# Patient Record
Sex: FEMALE | Race: WHITE
[De-identification: names, ages, dates, MRNs, and addresses within clinical notes are randomized per-mention and may not be internally consistent; named-entity substitution may affect disease eponyms.]

---

## 2018-03-20 ENCOUNTER — HOSPITAL ENCOUNTER (EMERGENCY)
Dept: HOSPITAL 91 - E/R | Age: 43
Discharge: HOME | End: 2018-03-20
Payer: MEDICAID

## 2018-03-20 ENCOUNTER — HOSPITAL ENCOUNTER (EMERGENCY)
Age: 43
Discharge: HOME | End: 2018-03-20

## 2018-03-20 DIAGNOSIS — J03.90: Primary | ICD-10-CM

## 2018-03-20 DIAGNOSIS — J01.00: ICD-10-CM

## 2018-03-20 PROCEDURE — 99283 EMERGENCY DEPT VISIT LOW MDM: CPT

## 2018-09-25 ENCOUNTER — HOSPITAL ENCOUNTER (EMERGENCY)
Age: 43
Discharge: HOME | End: 2018-09-25

## 2018-09-25 ENCOUNTER — HOSPITAL ENCOUNTER (EMERGENCY)
Dept: HOSPITAL 91 - FTE | Age: 43
Discharge: HOME | End: 2018-09-25
Payer: MEDICAID

## 2018-09-25 DIAGNOSIS — M54.2: Primary | ICD-10-CM

## 2018-09-25 PROCEDURE — 99282 EMERGENCY DEPT VISIT SF MDM: CPT

## 2018-09-25 RX ADMIN — IBUPROFEN 1 MG: 800 TABLET, FILM COATED ORAL at 16:34

## 2019-03-08 ENCOUNTER — HOSPITAL ENCOUNTER (EMERGENCY)
Dept: HOSPITAL 10 - FTE | Age: 44
Discharge: HOME | End: 2019-03-08
Payer: MEDICAID

## 2019-03-08 ENCOUNTER — HOSPITAL ENCOUNTER (EMERGENCY)
Dept: HOSPITAL 91 - FTE | Age: 44
Discharge: HOME | End: 2019-03-08
Payer: MEDICAID

## 2019-03-08 VITALS — WEIGHT: 187.39 LBS | HEIGHT: 55 IN | BODY MASS INDEX: 43.37 KG/M2

## 2019-03-08 VITALS — RESPIRATION RATE: 18 BRPM | SYSTOLIC BLOOD PRESSURE: 131 MMHG | DIASTOLIC BLOOD PRESSURE: 82 MMHG | HEART RATE: 71 BPM

## 2019-03-08 DIAGNOSIS — M54.41: Primary | ICD-10-CM

## 2019-03-08 LAB
URINE KETONES (DIP) POC: (no result)
URINE PH (DIP) POC: 6.5 (ref 5–8.5)

## 2019-03-08 PROCEDURE — 81025 URINE PREGNANCY TEST: CPT

## 2019-03-08 PROCEDURE — 96372 THER/PROPH/DIAG INJ SC/IM: CPT

## 2019-03-08 PROCEDURE — 81003 URINALYSIS AUTO W/O SCOPE: CPT

## 2019-03-08 PROCEDURE — 99284 EMERGENCY DEPT VISIT MOD MDM: CPT

## 2019-03-08 RX ADMIN — KETOROLAC TROMETHAMINE 1 MG: 30 INJECTION, SOLUTION INTRAMUSCULAR at 11:25

## 2019-03-08 NOTE — ERD
ER Documentation


Chief Complaint


Chief Complaint





DYSURIA SINCE LAST WEEK





HPI


43-year old female presents with low back pain for the last week.  She does a 


lot of lifting at work but denies any history of acute trauma.  She also feels 


that she is urinating frequently.  She is concerned she has a kidney infection 


as she was told she had a kidney for infection in the past.  She points to her 


right L4-L5 and gluteal area or iliac areas as area of pain.  Pain radiates to 


her right buttock and leg.  She describes it in triage as 0 out of 10.





ROS


All systems reviewed and are negative except as per history of present illness.





Medications


Home Meds


Active Scripts


Cyclobenzaprine Hcl* (Cyclobenzaprine Hcl*) 10 Mg Tablet, 10 MG PO TID, #20 TAB


   Prov:MAGDALENE LOPES MD         3/8/19


Ibuprofen* (Motrin*) 600 Mg Tab, 600 MG PO Q6, #20 TAB


   Prov:MAGDALENE LOPES MD         3/8/19


Naproxen* (Naprosyn*) 500 Mg Tablet, 500 MG PO BID PRN for PAIN AND/OR 


INFLAMMATION, #30 TAB


   Prov:PAPI HOOD PA-C         9/25/18


Fluticasone Propionate (Flonase Allergy Relief) 9.9 Ml [place].susp, 1 SPRAY NASAL


BID, #1 BOTTLE


   TO EACH NOSTRIL


   Prov:SHANTELL,ELISEO         3/20/18


Phenol/Glycerin (Chloraseptic Max Spray) 30 Ml Spray, 5 SPRAY MM Q2H PRN for 


SORE THROAT, #1 BOTTLE


   Prov:SHANTELL,ELISEO         3/20/18


Azithromycin* (Zithromax*) 250 Mg Tablet, 250 MG PO .ZPACK AS DIRECTED, #6 TAB


   TAKE 500 MG (2 TABS) THE FIRST DAY THEN 250 MG (1 TAB) DAYS 2-5


   Prov:SHANTELL,ELISEO         3/20/18





Allergies


Allergies:  


Coded Allergies:  


     No Known Allergy (Verified , 9/25/18)





PMhx/Soc


Medical and Surgical Hx:  pt denies Medical Hx, pt denies Surgical Hx


Hx Alcohol Use:  No


Hx Substance Use:  No


Hx Tobacco Use:  No


Smoking Status:  Never smoker





FmHx


Family History:  No diabetes, No coronary disease, No other





Physical Exam


Vitals





Vital Signs


  Date      Temp  Pulse  Resp  B/P (MAP)   Pulse Ox  O2          O2 Flow    FiO2


Time                                                 Delivery    Rate


    3/8/19  98.9     71    18      131/82        99  Room Air


     11:55                           (98)


    3/8/19  98.9     79    18      130/71        99


     09:42                           (90)





Physical Exam


Const:   No acute distress


Head:   Atraumatic 


Eyes:    Normal Conjunctiva


ENT:    Normal External Ears, Nose and Mouth.


Neck:               Full range of motion. No meningismus.


Resp:   Clear to auscultation bilaterally


Cardio:   Regular rate and rhythm, no murmurs


Abd:    Soft, non tender, non distended. Normal bowel sounds


Skin:   No petechiae or rashes


Back:   No midline or flank tenderness.  Tenderness right L4 L5-S1 area lumbar 


paraspinous muscles.  Positive straight leg raise on the right.


Ext:    No cyanosis, or edema


Neur:   Awake and alert.  Normal gait.  No appreciable focal neurologic 


deficits.


Psych:    Normal Mood and Affect


Results 24 hrs





Laboratory Tests


        Test
                                3/8/19
11:21  3/8/19
11:38


        POC Beta HCG, Qualitative            NEGATIVE


        Bedside Urine pH (LAB)                                     6.5


        Bedside Urine Protein (LAB)                        Negative


        Bedside Urine Glucose (UA)                         Negative


        Bedside Urine Ketones (LAB)                                 1+


        Bedside Urine Blood                                Negative


        Bedside Urine Nitrite (LAB)                        Negative


        Bedside Urine Leukocyte
Esterase (L  
             Negative 






Current Medications


 Medications
   Dose
          Sig/Miguel
       Start Time
   Status  Last


 (Trade)       Ordered        Route
 PRN     Stop Time              Admin
Dose


                              Reason                                Admin


 Ketorolac
     30 mg          ONCE  STAT
    3/8/19        DC            3/8/19


Tromethamine
                 IM
            11:11
 3/8/19                11:25



 (Toradol)                                   11:13








Procedures/MDM


Urine shows no leukocytes, nitrites, glucose, hemoglobin.  HCG negative.  


Patient was given Toradol 30 mg IM.  Patient presents with right-sided low back 


pain.  She is concerned about a kidney infection but her urine is normal.  Pain 


is not in the area of kidneys.  Pain is likely musculoskeletal, lumbar strain or


sciatica.  No signs of cauda equina syndrome, epidural abscess, deficits, 


ischemia, additional complications.  Will treat with ibuprofen, Flexeril, 


instructions for back exercises, primary care follow-up and return precautions. 


The patient was stable with no new complaints during the ER course. Clinically, 


there is no current evidence to suggest meningitis, sepsis, acute abdomen, 


pneumonia, stroke,  acute coronary syndrome, pulmonary embolism, aortic 


dissection or any other emergent condition appearing to require further 


evaluation or hospitalization.  Patient counseled regarding my diagnostic imp


ression and care plan. Prior to discharge all questions answered. Pt agrees with


treatment plan and understands strict return precautions. Pt is instructed to 


follow up with primary care provider within 24-48 hours. Precautionary 


instructions provided including instructions to return to the ER if not 


improving or for any worsening or changing symptoms or concerns.





Departure


Diagnosis:  


   Primary Impression:  


   Back pain


   Back pain location:  low back pain  Chronicity:  acute  Back pain laterality:


    right  Sciatica presence:  with sciatica  Sciatica laterality:  sciatica of 


   right side  Qualified Codes:  M54.41 - Lumbago with sciatica, right side


Condition:  Stable


Patient Instructions:  Back Exercises, Lumbar, Back Pain W/ Sciatica


Referrals:  


[facility]  ([facility])


Usted se ha hecho un examen mdico de control que le indica que no est en ludy 


condicin que requiera tratamiento urgente en el Departamento de Emergencia. Un 


estudio ms profundo y el tratamiento de glass condicin pueden esperar sin ningn 


riesgo hasta que usted sea atendida/o en el consultorio de glass mdico o ludy 


clnica. Es responsabilidad suya arreglar ludy alexandra para el seguimiento del bhargav.








MANEJO DE CONDICIONES NO URGENTES EN EL FUTURO


1) Si usted tiene un mdico de atencin primaria:





Usted debera llamar a glass mdico de atencin primaria antes de venir al 


departamento de emergencia. Despus de las horas de consultorio, glass doctor o glass 


asociado/a est disponible por telfono. El mdico o enfermero de catherine en el 


servicio telefnico puede asesorarle por mimi medio para atender el problema, o 


bhargav contrario se puede programar ludy alexandra.





2) Si usted no tiene un mdico de atencin primaria:


Llame al mdico o clnica de referencia que aparece abajo heladio las horas de 


consultorio para hacer ludy alexandra para que le vean.





CLINICAS:


[facility]  [phone] [address] [facility]  [phone] [address] [facility] [phone] [address] [place] [phone] [address] [place] [address] [phone] [facility]. [phone] 


[address]





Additional Instructions:  


orina / rinones normal.   dolor es de espalda. Cheque otro vez con glass doctor 


primario en el proximo vincent or regresa para mas o nueva simptomas.











MAGDALENE LOPES MD              Mar 8, 2019 11:51

## 2019-03-11 ENCOUNTER — HOSPITAL ENCOUNTER (EMERGENCY)
Dept: HOSPITAL 10 - FTE | Age: 44
Discharge: HOME | End: 2019-03-11
Payer: MEDICAID

## 2019-03-11 ENCOUNTER — HOSPITAL ENCOUNTER (EMERGENCY)
Dept: HOSPITAL 91 - FTE | Age: 44
Discharge: HOME | End: 2019-03-11
Payer: MEDICAID

## 2019-03-11 VITALS — DIASTOLIC BLOOD PRESSURE: 82 MMHG | HEART RATE: 72 BPM | RESPIRATION RATE: 20 BRPM | SYSTOLIC BLOOD PRESSURE: 119 MMHG

## 2019-03-11 DIAGNOSIS — M54.5: Primary | ICD-10-CM

## 2019-03-11 PROCEDURE — 96372 THER/PROPH/DIAG INJ SC/IM: CPT

## 2019-03-11 PROCEDURE — 99284 EMERGENCY DEPT VISIT MOD MDM: CPT

## 2019-03-11 PROCEDURE — 81025 URINE PREGNANCY TEST: CPT

## 2019-03-11 RX ADMIN — KETOROLAC TROMETHAMINE 1 MG: 30 INJECTION, SOLUTION INTRAMUSCULAR at 16:28

## 2019-03-11 RX ADMIN — CYCLOBENZAPRINE 1 MG: 10 TABLET, FILM COATED ORAL at 16:28

## 2019-03-11 NOTE — ERD
ER Documentation


Chief Complaint


Chief Complaint





Complains of back pain x  3 days





HPI


This is a 43-year-old female presents ED with complaints of low back pain times 


4 days.  Patient states that 4 days ago her pots and pans


He fell on the floor and when she went down to pick them up she hurt her low 


back.  Patient denies any fever, chills, chest pain, shortness breath, dysuria, 


hematuria, diarrhea, constipation, bowel/bladder incontinence, tingling, 


numbness sensation, saddle paresthesias and other symptoms.  No history of IV 


drug use.





ROS


All systems reviewed and are negative except as per history of present illness.





Medications


Home Meds


Active Scripts


Hydrocodone/Acetaminophen (Norco 5-325 Tablet) 1 Each Tablet, 1 TAB PO Q6H PRN 


for PAIN, #3 TAB


   Prov:LAUREN GONZALEZ PA-C         3/11/19


Diclofenac Sodium* (Voltaren* Gel) 1% -100 Gm Gel, 2 GM TOP QID, #1 TUB


   Prov:LAUREN GONZALEZ PA-C         3/11/19


Cyclobenzaprine Hcl* (Cyclobenzaprine Hcl*) 10 Mg Tablet, 10 MG PO TID, #15 TAB


   Prov:LAUREN GONZALEZ PA-C         3/11/19


Naproxen* (Naprosyn*) 500 Mg Tablet, 500 MG PO BID PRN for PAIN AND/OR 


INFLAMMATION, #30 TAB


   Prov:LAUREN GONZALEZ PA-C         3/11/19


Cyclobenzaprine Hcl* (Cyclobenzaprine Hcl*) 10 Mg Tablet, 10 MG PO TID, #20 TAB


   Prov:MAGDALENE LOPES MD         3/8/19


Ibuprofen* (Motrin*) 600 Mg Tab, 600 MG PO Q6, #20 TAB


   Prov:MAGDALENE LOPES MD         3/8/19


Naproxen* (Naprosyn*) 500 Mg Tablet, 500 MG PO BID PRN for PAIN AND/OR 


INFLAMMATION, #30 TAB


   Prov:PAPI HOOD PA-C         18


Fluticasone Propionate (Flonase Allergy Relief) 9.9 Ml [place].susp, 1 SPRAY NASAL


BID, #1 BOTTLE


   TO EACH NOSTRIL


   Prov:ELISEO STINSON         3/20/18


Phenol/Glycerin (Chloraseptic Max Spray) 30 Ml Spray, 5 SPRAY MM Q2H PRN for 


SORE THROAT, #1 BOTTLE


   Prov:SHANTELL,ELISEO         3/20/18


Azithromycin* (Zithromax*) 250 Mg Tablet, 250 MG PO .ZPACK AS DIRECTED, #6 TAB


   TAKE 500 MG (2 TABS) THE FIRST DAY THEN 250 MG (1 TAB) DAYS 2-5


   Prov:SHANTELL,ELISEO         3/20/18





Allergies


Allergies:  


Coded Allergies:  


     No Known Allergy (Verified , 18)





PMhx/Soc


Medical and Surgical Hx:  pt denies Medical Hx, pt denies Surgical Hx


Hx Alcohol Use:  No


Hx Substance Use:  No


Hx Tobacco Use:  No





FmHx


Family History:  No diabetes





Physical Exam


Vitals





Vital Signs


  Date      Temp  Pulse  Resp  B/P (MAP)   Pulse Ox  O2          O2 Flow    FiO2


Time                                                 Delivery    Rate


   3/11/19  98.5     72    20      119/82        98


     14:35                           (94)





Physical Exam


Physical Exam


Vitals signs: Reviewed by me.


General: Well developed, well nourished, in no acute distress. Patient is awake 


and alert.


Head: Normocephalic, atraumatic.


Eyes: Normal conjunctiva, Pupils PERRLA, EOM intact grossly


ENT: Pharynx is clear, Moist mucous membranes, external ears, nose and mouth 


normal


Neck: Supple, no masses, lymphadenopathy or JVD


Respiratory: Clear to auscultation bilaterally with no wheezing, rhonchi, rales,


no distress


Cardiovascular: RRR, no murmurs, rubs, or gallops


Back: No midline tenderness.  No thoracic or lumbar midline tenderness, there is


mild tenderness palpation along the paravertebral muscles in the lumbar spine, 


no decreased range of motion flexion, extension and left and right lateral 


rotation


Neurologic: Alert and oriented, moving all extremities, normal speech, no focal 


weakness, no cerebellar signs. Normal mentation


Skin: warm and dry, No rash


Psych: Normal mood


Results 24 hrs





Current Medications


 Medications
   Dose
          Sig/Miguel
       Start Time
   Status  Last


 (Trade)       Ordered        Route
 PRN     Stop Time              Admin
Dose


                              Reason                                Admin


 Ketorolac
     60 mg          ONCE  STAT
    3/11/19       DC       



Tromethamine
                 IM
            16:01



 (Toradol)                                   3/11/19 16:02


                1 tab          ONCE  ONCE
    3/11/19       DC       



Acetaminophen                 PO
            16:30



/
                                           3/11/19 16:30


Hydrocodone


Bitart



(Norco


(5/325))


                10 mg          ONCE  ONCE
    3/11/19                



Cyclobenzapri                 PO
            16:30



ne
 HCl
                                     3/11/19 16:31


(Flexeril)








Procedures/MDM








ER COURSE:


The patient was given [Toradol and Flexeril


The medication was well tolerated and the patient reports improvement in 


symptoms.  


The patient was stable throughout ED course.


I kept the patient and/or family informed of laboratory and diagnostic imaging 


results throughout the emergency room course.





The patient was promptly evaluated and a treatment plan was devised based on H&P


 and other data. This plan was discussed with the patient who agreed and had no 


further questions or concerns prior to discharge.





MEDICAL DECISION MAKIN-year-old female presents to ED with low back pain times 4 days.  Given 


mechanism of injury and location of back pain being along the paravertebral 


muscles this is likely a muscle strain or muscle related pain.  History and 


physical examination other data not consistent with processing including cauda 


equina syndrome, cord compression, infiltrative etiology, infectious etiology, 


epidural abscess, fracture, obstructive pyelonephritis, abdominal aortic 


aneurysm. Vitals are stable and patient can be managed outpatient with close 


follow-up. Advised patient to follow up with primary care in the next 48 hours. 


return to ED with any worsening symptoms


 








DISPOSITION PLAN:


We discussed follow up with the patient's primary care doctor within 24 to 48 


hours.  Patient counseled regarding my diagnostic impression and care plan. Pr


ior to discharge all questions answered. Pt agrees with treatment plan and 


understands strict return precautions. Precautionary instructions provided 


including instructions to return to the ER if not improving or for any worsening


 or changing symptoms or concerns.





SPECIALIST FOLLOW UP RECOMMENDED: None


Patient has been advised to follow up with primary care in 1-2 days.





Disclaimer: Inadvertent spelling and grammatical errors are likely due to 


EHR/dictation software use and do not reflect on the overall quality of patient 


care. Also, please note that the electronic time recorded on this note does not 


necessarily reflect the actual time of the patient encounter.





Departure


Diagnosis:  


   Primary Impression:  


   Back pain


   Back pain location:  low back pain  Chronicity:  acute  Back pain laterality:


     bilateral  Sciatica presence:  without sciatica  Qualified Codes:  M54.5 - 


   Low back pain


Condition:  Stable


Patient Instructions:  Back Pain (Acute Or Chronic)


Referrals:  


[facility]  ([facility])


Usted se ha hecho un examen mdico de control que le indica que no est en ludy 


condicin que requiera tratamiento urgente en el Departamento de Emergencia. Un 


estudio ms profundo y el tratamiento de glass condicin pueden esperar sin ningn 


riesgo hasta que usted sea atendida/o en el consultorio de glass mdico o ludy 


clnica. Es responsabilidad suya arreglar ludy alexandra para el seguimiento del bhargav.


 





MANEJO DE CONDICIONES NO URGENTES EN EL FUTURO


1) Si usted tiene un mdico de atencin primaria:





Usted debera llamar a glass mdico de atencin primaria antes de venir al 


departamento de emergencia. Despus de las horas de consultorio, glass doctor o glass 


asociado/a est disponible por telfono. El mdico o enfermero de catherine en el 


servicio telefnico puede asesorarle por mimi medio para atender el problema, o 


bhargav contrario se puede programar ludy alexandra.





2) Si usted no tiene un mdico de atencin primaria:


Llame al mdico o clnica de referencia que aparece abajo heladio las horas de 


consultorio para hacer ludy alexandra para que le vean.





CLINICAS:


[facility]  [phone] [address] [facility]  [phone] [address] [facility] [facility] [phone]


[address] [facility] [place] [phone]


[address] [facility] [place] [address] [phone] [facility]. [phone] 


[address]





Additional Instructions:  


Paciente aconseja volver a Departamento de urgencias inmediatamente para 


sntomas nuevos o que empeoran . Paciente aconseja posteriores con el PCP en 1-2


 cesar . Paciente verbaliza la comprehensin y est de acuerdo con el tratamiento


 y el curso de accin.





Si el paciente no tiene ninguna de atencin primaria pueden seguir con





[facility]


[address] [place] [address]





o





[facility] + [address] [address]











LAUREN GONZALEZ PA-C          Mar 11, 2019 16:11